# Patient Record
Sex: FEMALE | Race: WHITE | Employment: FULL TIME | URBAN - METROPOLITAN AREA
[De-identification: names, ages, dates, MRNs, and addresses within clinical notes are randomized per-mention and may not be internally consistent; named-entity substitution may affect disease eponyms.]

---

## 2024-05-29 ENCOUNTER — HOSPITAL ENCOUNTER (INPATIENT)
Age: 29
LOS: 1 days | Discharge: HOME | End: 2024-05-31
Attending: EMERGENCY MEDICINE | Admitting: THORACIC SURGERY (CARDIOTHORACIC VASCULAR SURGERY)
Payer: COMMERCIAL

## 2024-05-29 DIAGNOSIS — K31.89 GASTRIC DISTENTION: Primary | ICD-10-CM

## 2024-05-29 DIAGNOSIS — K91.89 OTHER POSTOPERATIVE COMPLICATION INVOLVING DIGESTIVE SYSTEM: ICD-10-CM

## 2024-05-29 DIAGNOSIS — R11.2 NAUSEA AND VOMITING, UNSPECIFIED VOMITING TYPE: ICD-10-CM

## 2024-05-29 PROCEDURE — 99285 EMERGENCY DEPT VISIT HI MDM: CPT

## 2024-05-29 PROCEDURE — 96374 THER/PROPH/DIAG INJ IV PUSH: CPT

## 2024-05-29 PROCEDURE — 6360000002 HC RX W HCPCS: Performed by: EMERGENCY MEDICINE

## 2024-05-29 PROCEDURE — 2580000003 HC RX 258: Performed by: EMERGENCY MEDICINE

## 2024-05-29 RX ORDER — PROMETHAZINE HYDROCHLORIDE 25 MG/ML
25 INJECTION, SOLUTION INTRAMUSCULAR; INTRAVENOUS ONCE
Status: COMPLETED | OUTPATIENT
Start: 2024-05-29 | End: 2024-05-30

## 2024-05-29 RX ORDER — ONDANSETRON 2 MG/ML
4 INJECTION INTRAMUSCULAR; INTRAVENOUS ONCE
Status: COMPLETED | OUTPATIENT
Start: 2024-05-29 | End: 2024-05-29

## 2024-05-29 RX ORDER — 0.9 % SODIUM CHLORIDE 0.9 %
1000 INTRAVENOUS SOLUTION INTRAVENOUS ONCE
Status: COMPLETED | OUTPATIENT
Start: 2024-05-29 | End: 2024-05-30

## 2024-05-29 RX ADMIN — ONDANSETRON 4 MG: 2 INJECTION INTRAMUSCULAR; INTRAVENOUS at 23:37

## 2024-05-29 RX ADMIN — SODIUM CHLORIDE 1000 ML: 9 INJECTION, SOLUTION INTRAVENOUS at 23:37

## 2024-05-30 ENCOUNTER — PREP FOR PROCEDURE (OUTPATIENT)
Dept: GASTROENTEROLOGY | Age: 29
End: 2024-05-30

## 2024-05-30 ENCOUNTER — APPOINTMENT (OUTPATIENT)
Dept: CT IMAGING | Age: 29
End: 2024-05-30
Payer: COMMERCIAL

## 2024-05-30 ENCOUNTER — APPOINTMENT (OUTPATIENT)
Dept: GENERAL RADIOLOGY | Age: 29
End: 2024-05-30
Payer: COMMERCIAL

## 2024-05-30 ENCOUNTER — ANESTHESIA (OUTPATIENT)
Dept: ENDOSCOPY | Age: 29
End: 2024-05-30
Payer: COMMERCIAL

## 2024-05-30 ENCOUNTER — ANESTHESIA EVENT (OUTPATIENT)
Dept: ENDOSCOPY | Age: 29
End: 2024-05-30
Payer: COMMERCIAL

## 2024-05-30 PROBLEM — R11.2 NAUSEA AND VOMITING: Status: ACTIVE | Noted: 2024-05-30

## 2024-05-30 PROBLEM — K31.89 GASTRIC DISTENTION: Status: ACTIVE | Noted: 2024-05-30

## 2024-05-30 PROBLEM — K31.84 GASTROPARESIS: Status: ACTIVE | Noted: 2024-05-30

## 2024-05-30 LAB
ALBUMIN SERPL-MCNC: 4.7 G/DL (ref 3.5–4.6)
ALP SERPL-CCNC: 83 U/L (ref 40–130)
ALT SERPL-CCNC: 55 U/L (ref 0–33)
AMPHET UR QL SCN: ABNORMAL
ANION GAP SERPL CALCULATED.3IONS-SCNC: 19 MEQ/L (ref 9–15)
AST SERPL-CCNC: 29 U/L (ref 0–35)
BACTERIA URNS QL MICRO: ABNORMAL /HPF
BARBITURATES UR QL SCN: ABNORMAL
BASOPHILS # BLD: 0.1 K/UL (ref 0–0.2)
BASOPHILS NFR BLD: 0.5 %
BENZODIAZ UR QL SCN: ABNORMAL
BILIRUB SERPL-MCNC: 0.5 MG/DL (ref 0.2–0.7)
BILIRUB UR QL STRIP: NEGATIVE
BUN SERPL-MCNC: 14 MG/DL (ref 6–20)
CALCIUM SERPL-MCNC: 10.1 MG/DL (ref 8.5–9.9)
CANNABINOIDS UR QL SCN: POSITIVE
CHLORIDE SERPL-SCNC: 100 MEQ/L (ref 95–107)
CLARITY UR: CLEAR
CO2 SERPL-SCNC: 19 MEQ/L (ref 20–31)
COCAINE UR QL SCN: ABNORMAL
COLOR UR: YELLOW
CREAT SERPL-MCNC: 0.76 MG/DL (ref 0.5–0.9)
DRUG SCREEN COMMENT UR-IMP: ABNORMAL
EOSINOPHIL # BLD: 0.3 K/UL (ref 0–0.7)
EOSINOPHIL NFR BLD: 1.6 %
EPI CELLS #/AREA URNS AUTO: ABNORMAL /HPF (ref 0–5)
ERYTHROCYTE [DISTWIDTH] IN BLOOD BY AUTOMATED COUNT: 13.5 % (ref 11.5–14.5)
ERYTHROCYTE [DISTWIDTH] IN BLOOD BY AUTOMATED COUNT: 13.6 % (ref 11.5–14.5)
FENTANYL SCREEN, URINE: ABNORMAL
GLOBULIN SER CALC-MCNC: 3.1 G/DL (ref 2.3–3.5)
GLUCOSE SERPL-MCNC: 171 MG/DL (ref 70–99)
GLUCOSE UR STRIP-MCNC: NEGATIVE MG/DL
HCG, URINE, POC: NEGATIVE
HCT VFR BLD AUTO: 39.5 % (ref 37–47)
HCT VFR BLD AUTO: 43.8 % (ref 37–47)
HGB BLD-MCNC: 13.3 G/DL (ref 12–16)
HGB BLD-MCNC: 14.9 G/DL (ref 12–16)
HGB UR QL STRIP: NEGATIVE
HYALINE CASTS #/AREA URNS AUTO: ABNORMAL /HPF (ref 0–5)
KETONES UR STRIP-MCNC: >=80 MG/DL
LEUKOCYTE ESTERASE UR QL STRIP: ABNORMAL
LYMPHOCYTES # BLD: 1.3 K/UL (ref 1–4.8)
LYMPHOCYTES NFR BLD: 7.7 %
Lab: NORMAL
MCH RBC QN AUTO: 28.8 PG (ref 27–31.3)
MCH RBC QN AUTO: 28.8 PG (ref 27–31.3)
MCHC RBC AUTO-ENTMCNC: 33.7 % (ref 33–37)
MCHC RBC AUTO-ENTMCNC: 34 % (ref 33–37)
MCV RBC AUTO: 84.7 FL (ref 79.4–94.8)
MCV RBC AUTO: 85.5 FL (ref 79.4–94.8)
METHADONE UR QL SCN: ABNORMAL
MONOCYTES # BLD: 0.9 K/UL (ref 0.2–0.8)
MONOCYTES NFR BLD: 5 %
NEGATIVE QC PASS/FAIL: NORMAL
NEUTROPHILS # BLD: 14.4 K/UL (ref 1.4–6.5)
NEUTS SEG NFR BLD: 84.6 %
NITRITE UR QL STRIP: NEGATIVE
OPIATES UR QL SCN: ABNORMAL
OXYCODONE UR QL SCN: POSITIVE
PCP UR QL SCN: ABNORMAL
PH UR STRIP: 6 [PH] (ref 5–9)
PLATELET # BLD AUTO: 456 K/UL (ref 130–400)
PLATELET # BLD AUTO: 555 K/UL (ref 130–400)
POSITIVE QC PASS/FAIL: NORMAL
POTASSIUM SERPL-SCNC: 3.6 MEQ/L (ref 3.4–4.9)
PROPOXYPH UR QL SCN: ABNORMAL
PROT SERPL-MCNC: 7.8 G/DL (ref 6.3–8)
PROT UR STRIP-MCNC: ABNORMAL MG/DL
RBC # BLD AUTO: 4.62 M/UL (ref 4.2–5.4)
RBC # BLD AUTO: 5.17 M/UL (ref 4.2–5.4)
RBC #/AREA URNS AUTO: ABNORMAL /HPF (ref 0–5)
SODIUM SERPL-SCNC: 138 MEQ/L (ref 135–144)
SP GR UR STRIP: 1.03 (ref 1–1.03)
UROBILINOGEN UR STRIP-ACNC: 0.2 E.U./DL
WBC # BLD AUTO: 11.8 K/UL (ref 4.8–10.8)
WBC # BLD AUTO: 17 K/UL (ref 4.8–10.8)
WBC #/AREA URNS AUTO: ABNORMAL /HPF (ref 0–5)

## 2024-05-30 PROCEDURE — 80053 COMPREHEN METABOLIC PANEL: CPT

## 2024-05-30 PROCEDURE — 0D9670Z DRAINAGE OF STOMACH WITH DRAINAGE DEVICE, VIA NATURAL OR ARTIFICIAL OPENING: ICD-10-PCS | Performed by: PEDIATRICS

## 2024-05-30 PROCEDURE — A4216 STERILE WATER/SALINE, 10 ML: HCPCS | Performed by: INTERNAL MEDICINE

## 2024-05-30 PROCEDURE — 7100000010 HC PHASE II RECOVERY - FIRST 15 MIN: Performed by: INTERNAL MEDICINE

## 2024-05-30 PROCEDURE — 96375 TX/PRO/DX INJ NEW DRUG ADDON: CPT

## 2024-05-30 PROCEDURE — 2580000003 HC RX 258: Performed by: INTERNAL MEDICINE

## 2024-05-30 PROCEDURE — 7100000011 HC PHASE II RECOVERY - ADDTL 15 MIN: Performed by: INTERNAL MEDICINE

## 2024-05-30 PROCEDURE — 85027 COMPLETE CBC AUTOMATED: CPT

## 2024-05-30 PROCEDURE — 6360000002 HC RX W HCPCS: Performed by: THORACIC SURGERY (CARDIOTHORACIC VASCULAR SURGERY)

## 2024-05-30 PROCEDURE — 1210000000 HC MED SURG R&B

## 2024-05-30 PROCEDURE — 96372 THER/PROPH/DIAG INJ SC/IM: CPT

## 2024-05-30 PROCEDURE — 3609017100 HC EGD: Performed by: INTERNAL MEDICINE

## 2024-05-30 PROCEDURE — 6370000000 HC RX 637 (ALT 250 FOR IP): Performed by: THORACIC SURGERY (CARDIOTHORACIC VASCULAR SURGERY)

## 2024-05-30 PROCEDURE — 99254 IP/OBS CNSLTJ NEW/EST MOD 60: CPT | Performed by: INTERNAL MEDICINE

## 2024-05-30 PROCEDURE — 6360000004 HC RX CONTRAST MEDICATION: Performed by: EMERGENCY MEDICINE

## 2024-05-30 PROCEDURE — 2709999900 HC NON-CHARGEABLE SUPPLY: Performed by: INTERNAL MEDICINE

## 2024-05-30 PROCEDURE — 80307 DRUG TEST PRSMV CHEM ANLYZR: CPT

## 2024-05-30 PROCEDURE — A4216 STERILE WATER/SALINE, 10 ML: HCPCS | Performed by: THORACIC SURGERY (CARDIOTHORACIC VASCULAR SURGERY)

## 2024-05-30 PROCEDURE — 6360000002 HC RX W HCPCS: Performed by: REGISTERED NURSE

## 2024-05-30 PROCEDURE — 71045 X-RAY EXAM CHEST 1 VIEW: CPT

## 2024-05-30 PROCEDURE — 3700000001 HC ADD 15 MINUTES (ANESTHESIA): Performed by: INTERNAL MEDICINE

## 2024-05-30 PROCEDURE — 0DJ08ZZ INSPECTION OF UPPER INTESTINAL TRACT, VIA NATURAL OR ARTIFICIAL OPENING ENDOSCOPIC: ICD-10-PCS | Performed by: INTERNAL MEDICINE

## 2024-05-30 PROCEDURE — 2500000003 HC RX 250 WO HCPCS: Performed by: THORACIC SURGERY (CARDIOTHORACIC VASCULAR SURGERY)

## 2024-05-30 PROCEDURE — 2500000003 HC RX 250 WO HCPCS: Performed by: REGISTERED NURSE

## 2024-05-30 PROCEDURE — 3700000000 HC ANESTHESIA ATTENDED CARE: Performed by: INTERNAL MEDICINE

## 2024-05-30 PROCEDURE — 74177 CT ABD & PELVIS W/CONTRAST: CPT

## 2024-05-30 PROCEDURE — 74022 RADEX COMPL AQT ABD SERIES: CPT

## 2024-05-30 PROCEDURE — 85025 COMPLETE CBC W/AUTO DIFF WBC: CPT

## 2024-05-30 PROCEDURE — 81001 URINALYSIS AUTO W/SCOPE: CPT

## 2024-05-30 PROCEDURE — 99222 1ST HOSP IP/OBS MODERATE 55: CPT | Performed by: THORACIC SURGERY (CARDIOTHORACIC VASCULAR SURGERY)

## 2024-05-30 PROCEDURE — 36415 COLL VENOUS BLD VENIPUNCTURE: CPT

## 2024-05-30 PROCEDURE — C9113 INJ PANTOPRAZOLE SODIUM, VIA: HCPCS | Performed by: INTERNAL MEDICINE

## 2024-05-30 PROCEDURE — 2580000003 HC RX 258: Performed by: THORACIC SURGERY (CARDIOTHORACIC VASCULAR SURGERY)

## 2024-05-30 PROCEDURE — 6360000002 HC RX W HCPCS: Performed by: INTERNAL MEDICINE

## 2024-05-30 PROCEDURE — 6370000000 HC RX 637 (ALT 250 FOR IP): Performed by: INTERNAL MEDICINE

## 2024-05-30 PROCEDURE — 6360000002 HC RX W HCPCS: Performed by: EMERGENCY MEDICINE

## 2024-05-30 RX ORDER — SODIUM CHLORIDE 0.9 % (FLUSH) 0.9 %
5-40 SYRINGE (ML) INJECTION PRN
Status: CANCELLED | OUTPATIENT
Start: 2024-05-30

## 2024-05-30 RX ORDER — ENOXAPARIN SODIUM 100 MG/ML
40 INJECTION SUBCUTANEOUS DAILY
Status: DISCONTINUED | OUTPATIENT
Start: 2024-05-30 | End: 2024-05-30 | Stop reason: SDUPTHER

## 2024-05-30 RX ORDER — ONDANSETRON 2 MG/ML
4 INJECTION INTRAMUSCULAR; INTRAVENOUS EVERY 6 HOURS PRN
Status: DISCONTINUED | OUTPATIENT
Start: 2024-05-30 | End: 2024-05-30 | Stop reason: SDUPTHER

## 2024-05-30 RX ORDER — ACETAMINOPHEN 650 MG/1
650 SUPPOSITORY RECTAL EVERY 6 HOURS PRN
Status: DISCONTINUED | OUTPATIENT
Start: 2024-05-30 | End: 2024-05-31 | Stop reason: HOSPADM

## 2024-05-30 RX ORDER — SUCRALFATE 1 G/1
1 TABLET ORAL
Status: DISCONTINUED | OUTPATIENT
Start: 2024-05-30 | End: 2024-05-31 | Stop reason: HOSPADM

## 2024-05-30 RX ORDER — CETIRIZINE HYDROCHLORIDE 10 MG/1
10 TABLET ORAL NIGHTLY
COMMUNITY
Start: 2023-06-30

## 2024-05-30 RX ORDER — LIDOCAINE HYDROCHLORIDE 20 MG/ML
INJECTION, SOLUTION INFILTRATION; PERINEURAL PRN
Status: DISCONTINUED | OUTPATIENT
Start: 2024-05-30 | End: 2024-05-30 | Stop reason: SDUPTHER

## 2024-05-30 RX ORDER — SODIUM CHLORIDE 0.9 % (FLUSH) 0.9 %
5-40 SYRINGE (ML) INJECTION PRN
Status: DISCONTINUED | OUTPATIENT
Start: 2024-05-30 | End: 2024-05-31 | Stop reason: HOSPADM

## 2024-05-30 RX ORDER — SODIUM CHLORIDE 0.9 % (FLUSH) 0.9 %
5-40 SYRINGE (ML) INJECTION EVERY 12 HOURS SCHEDULED
Status: DISCONTINUED | OUTPATIENT
Start: 2024-05-30 | End: 2024-05-31 | Stop reason: HOSPADM

## 2024-05-30 RX ORDER — BUPROPION HYDROCHLORIDE 150 MG/1
150 TABLET, EXTENDED RELEASE ORAL 2 TIMES DAILY
COMMUNITY
Start: 2024-05-01 | End: 2024-06-30

## 2024-05-30 RX ORDER — MORPHINE SULFATE 4 MG/ML
4 INJECTION, SOLUTION INTRAMUSCULAR; INTRAVENOUS
Status: DISCONTINUED | OUTPATIENT
Start: 2024-05-30 | End: 2024-05-31 | Stop reason: HOSPADM

## 2024-05-30 RX ORDER — SODIUM CHLORIDE 9 MG/ML
INJECTION, SOLUTION INTRAVENOUS CONTINUOUS
Status: DISCONTINUED | OUTPATIENT
Start: 2024-05-30 | End: 2024-05-30

## 2024-05-30 RX ORDER — SIMETHICONE 80 MG
80 TABLET,CHEWABLE ORAL 4 TIMES DAILY
COMMUNITY
Start: 2024-05-25 | End: 2024-06-01

## 2024-05-30 RX ORDER — SULFAMETHOXAZOLE AND TRIMETHOPRIM 800; 160 MG/1; MG/1
1 TABLET ORAL EVERY 12 HOURS SCHEDULED
Status: DISCONTINUED | OUTPATIENT
Start: 2024-05-30 | End: 2024-05-31 | Stop reason: HOSPADM

## 2024-05-30 RX ORDER — ENOXAPARIN SODIUM 100 MG/ML
40 INJECTION SUBCUTANEOUS DAILY
Status: DISCONTINUED | OUTPATIENT
Start: 2024-05-30 | End: 2024-05-31 | Stop reason: HOSPADM

## 2024-05-30 RX ORDER — MAGNESIUM SULFATE IN WATER 40 MG/ML
2000 INJECTION, SOLUTION INTRAVENOUS PRN
Status: DISCONTINUED | OUTPATIENT
Start: 2024-05-30 | End: 2024-05-31 | Stop reason: HOSPADM

## 2024-05-30 RX ORDER — GLYCOPYRROLATE 1 MG/5 ML
SYRINGE (ML) INTRAVENOUS PRN
Status: DISCONTINUED | OUTPATIENT
Start: 2024-05-30 | End: 2024-05-30 | Stop reason: SDUPTHER

## 2024-05-30 RX ORDER — ESCITALOPRAM OXALATE 10 MG/1
10 TABLET ORAL DAILY
COMMUNITY
Start: 2024-02-13

## 2024-05-30 RX ORDER — SODIUM CHLORIDE 9 MG/ML
INJECTION, SOLUTION INTRAVENOUS PRN
Status: CANCELLED | OUTPATIENT
Start: 2024-05-30

## 2024-05-30 RX ORDER — METOCLOPRAMIDE HYDROCHLORIDE 5 MG/ML
10 INJECTION INTRAMUSCULAR; INTRAVENOUS ONCE
Status: COMPLETED | OUTPATIENT
Start: 2024-05-30 | End: 2024-05-30

## 2024-05-30 RX ORDER — SODIUM CHLORIDE 9 MG/ML
INJECTION, SOLUTION INTRAVENOUS PRN
Status: DISCONTINUED | OUTPATIENT
Start: 2024-05-30 | End: 2024-05-31 | Stop reason: HOSPADM

## 2024-05-30 RX ORDER — PANTOPRAZOLE SODIUM 40 MG/1
40 TABLET, DELAYED RELEASE ORAL
COMMUNITY
Start: 2024-05-09 | End: 2025-05-09

## 2024-05-30 RX ORDER — ACETAMINOPHEN 325 MG/1
650 TABLET ORAL EVERY 6 HOURS PRN
Status: DISCONTINUED | OUTPATIENT
Start: 2024-05-30 | End: 2024-05-31 | Stop reason: HOSPADM

## 2024-05-30 RX ORDER — ONDANSETRON 4 MG/1
4 TABLET, ORALLY DISINTEGRATING ORAL EVERY 8 HOURS PRN
Status: DISCONTINUED | OUTPATIENT
Start: 2024-05-30 | End: 2024-05-31 | Stop reason: HOSPADM

## 2024-05-30 RX ORDER — SODIUM CHLORIDE 0.9 % (FLUSH) 0.9 %
5-40 SYRINGE (ML) INJECTION EVERY 12 HOURS SCHEDULED
Status: CANCELLED | OUTPATIENT
Start: 2024-05-30

## 2024-05-30 RX ORDER — DEXTROSE MONOHYDRATE AND SODIUM CHLORIDE 5; .45 G/100ML; G/100ML
INJECTION, SOLUTION INTRAVENOUS CONTINUOUS
Status: DISCONTINUED | OUTPATIENT
Start: 2024-05-30 | End: 2024-05-31

## 2024-05-30 RX ORDER — ONDANSETRON 2 MG/ML
4 INJECTION INTRAMUSCULAR; INTRAVENOUS EVERY 6 HOURS PRN
Status: DISCONTINUED | OUTPATIENT
Start: 2024-05-30 | End: 2024-05-31 | Stop reason: HOSPADM

## 2024-05-30 RX ORDER — PROMETHAZINE HYDROCHLORIDE 12.5 MG/1
12.5 TABLET ORAL EVERY 6 HOURS PRN
Status: DISCONTINUED | OUTPATIENT
Start: 2024-05-30 | End: 2024-05-31 | Stop reason: HOSPADM

## 2024-05-30 RX ORDER — ONDANSETRON 4 MG/1
4 TABLET, ORALLY DISINTEGRATING ORAL EVERY 8 HOURS PRN
COMMUNITY
Start: 2024-03-07

## 2024-05-30 RX ORDER — MORPHINE SULFATE 2 MG/ML
2 INJECTION, SOLUTION INTRAMUSCULAR; INTRAVENOUS
Status: DISCONTINUED | OUTPATIENT
Start: 2024-05-30 | End: 2024-05-30 | Stop reason: SDUPTHER

## 2024-05-30 RX ORDER — METOCLOPRAMIDE HYDROCHLORIDE 5 MG/ML
10 INJECTION INTRAMUSCULAR; INTRAVENOUS EVERY 6 HOURS
Status: DISCONTINUED | OUTPATIENT
Start: 2024-05-30 | End: 2024-05-31 | Stop reason: HOSPADM

## 2024-05-30 RX ORDER — POTASSIUM CHLORIDE 7.45 MG/ML
10 INJECTION INTRAVENOUS PRN
Status: DISCONTINUED | OUTPATIENT
Start: 2024-05-30 | End: 2024-05-31 | Stop reason: HOSPADM

## 2024-05-30 RX ORDER — PROPOFOL 10 MG/ML
INJECTION, EMULSION INTRAVENOUS PRN
Status: DISCONTINUED | OUTPATIENT
Start: 2024-05-30 | End: 2024-05-30 | Stop reason: SDUPTHER

## 2024-05-30 RX ORDER — POTASSIUM CHLORIDE 20 MEQ/1
40 TABLET, EXTENDED RELEASE ORAL PRN
Status: DISCONTINUED | OUTPATIENT
Start: 2024-05-30 | End: 2024-05-31 | Stop reason: HOSPADM

## 2024-05-30 RX ORDER — OXYCODONE HYDROCHLORIDE 5 MG/1
5 TABLET ORAL EVERY 6 HOURS PRN
COMMUNITY
Start: 2024-05-25

## 2024-05-30 RX ORDER — MORPHINE SULFATE 4 MG/ML
4 INJECTION, SOLUTION INTRAMUSCULAR; INTRAVENOUS
Status: COMPLETED | OUTPATIENT
Start: 2024-05-30 | End: 2024-05-30

## 2024-05-30 RX ORDER — ACETAMINOPHEN 325 MG/1
650 TABLET ORAL EVERY 4 HOURS PRN
Status: DISCONTINUED | OUTPATIENT
Start: 2024-05-30 | End: 2024-05-30 | Stop reason: SDUPTHER

## 2024-05-30 RX ORDER — SODIUM CHLORIDE 9 MG/ML
INJECTION, SOLUTION INTRAVENOUS CONTINUOUS
Status: CANCELLED | OUTPATIENT
Start: 2024-05-30

## 2024-05-30 RX ORDER — LORAZEPAM 2 MG/ML
0.5 INJECTION INTRAMUSCULAR EVERY 6 HOURS PRN
Status: DISCONTINUED | OUTPATIENT
Start: 2024-05-30 | End: 2024-05-31 | Stop reason: HOSPADM

## 2024-05-30 RX ORDER — BISACODYL 10 MG
10 SUPPOSITORY, RECTAL RECTAL DAILY PRN
Status: DISCONTINUED | OUTPATIENT
Start: 2024-05-30 | End: 2024-05-31 | Stop reason: HOSPADM

## 2024-05-30 RX ADMIN — SULFAMETHOXAZOLE AND TRIMETHOPRIM 1 TABLET: 800; 160 TABLET ORAL at 17:22

## 2024-05-30 RX ADMIN — IOPAMIDOL 75 ML: 755 INJECTION, SOLUTION INTRAVENOUS at 02:21

## 2024-05-30 RX ADMIN — DEXTROSE AND SODIUM CHLORIDE: 5; 450 INJECTION, SOLUTION INTRAVENOUS at 07:57

## 2024-05-30 RX ADMIN — Medication 0.2 MG: at 15:58

## 2024-05-30 RX ADMIN — MORPHINE SULFATE 4 MG: 4 INJECTION, SOLUTION INTRAMUSCULAR; INTRAVENOUS at 21:09

## 2024-05-30 RX ADMIN — LIDOCAINE HYDROCHLORIDE 60 MG: 20 INJECTION, SOLUTION INFILTRATION; PERINEURAL at 15:58

## 2024-05-30 RX ADMIN — Medication 0.5 MG: at 14:17

## 2024-05-30 RX ADMIN — Medication 0.5 MG: at 21:06

## 2024-05-30 RX ADMIN — SUCRALFATE 1 G: 1 TABLET ORAL at 17:23

## 2024-05-30 RX ADMIN — METOCLOPRAMIDE HYDROCHLORIDE 10 MG: 5 INJECTION INTRAMUSCULAR; INTRAVENOUS at 03:26

## 2024-05-30 RX ADMIN — DEXTROSE AND SODIUM CHLORIDE: 5; 450 INJECTION, SOLUTION INTRAVENOUS at 21:28

## 2024-05-30 RX ADMIN — SULFAMETHOXAZOLE AND TRIMETHOPRIM 1 TABLET: 800; 160 TABLET ORAL at 21:15

## 2024-05-30 RX ADMIN — MORPHINE SULFATE 4 MG: 4 INJECTION, SOLUTION INTRAMUSCULAR; INTRAVENOUS at 09:48

## 2024-05-30 RX ADMIN — MORPHINE SULFATE 4 MG: 4 INJECTION, SOLUTION INTRAMUSCULAR; INTRAVENOUS at 03:38

## 2024-05-30 RX ADMIN — PROPOFOL 50 MG: 10 INJECTION, EMULSION INTRAVENOUS at 16:09

## 2024-05-30 RX ADMIN — PROMETHAZINE HYDROCHLORIDE 25 MG: 25 INJECTION INTRAMUSCULAR; INTRAVENOUS at 00:37

## 2024-05-30 RX ADMIN — Medication 10 ML: at 21:16

## 2024-05-30 RX ADMIN — ONDANSETRON 4 MG: 2 INJECTION INTRAMUSCULAR; INTRAVENOUS at 07:50

## 2024-05-30 RX ADMIN — SODIUM CHLORIDE, PRESERVATIVE FREE 10 ML: 5 INJECTION INTRAVENOUS at 21:15

## 2024-05-30 RX ADMIN — PROMETHAZINE HYDROCHLORIDE 12.5 MG: 12.5 TABLET ORAL at 18:42

## 2024-05-30 RX ADMIN — FAMOTIDINE 20 MG: 10 INJECTION, SOLUTION INTRAVENOUS at 07:53

## 2024-05-30 RX ADMIN — FAMOTIDINE 20 MG: 10 INJECTION, SOLUTION INTRAVENOUS at 21:11

## 2024-05-30 RX ADMIN — SODIUM CHLORIDE: 9 INJECTION, SOLUTION INTRAVENOUS at 06:16

## 2024-05-30 RX ADMIN — METOCLOPRAMIDE HYDROCHLORIDE 10 MG: 5 INJECTION INTRAMUSCULAR; INTRAVENOUS at 21:11

## 2024-05-30 RX ADMIN — PROPOFOL 150 MG: 10 INJECTION, EMULSION INTRAVENOUS at 15:58

## 2024-05-30 RX ADMIN — METOCLOPRAMIDE HYDROCHLORIDE 10 MG: 5 INJECTION INTRAMUSCULAR; INTRAVENOUS at 09:48

## 2024-05-30 RX ADMIN — PROPOFOL 50 MG: 10 INJECTION, EMULSION INTRAVENOUS at 16:02

## 2024-05-30 RX ADMIN — METOCLOPRAMIDE HYDROCHLORIDE 10 MG: 5 INJECTION INTRAMUSCULAR; INTRAVENOUS at 17:23

## 2024-05-30 RX ADMIN — PROPOFOL 100 MG: 10 INJECTION, EMULSION INTRAVENOUS at 16:06

## 2024-05-30 RX ADMIN — PANTOPRAZOLE SODIUM 40 MG: 40 INJECTION, POWDER, FOR SOLUTION INTRAVENOUS at 17:23

## 2024-05-30 RX ADMIN — SUCRALFATE 1 G: 1 TABLET ORAL at 21:15

## 2024-05-30 ASSESSMENT — PAIN SCALES - GENERAL
PAINLEVEL_OUTOF10: 7
PAINLEVEL_OUTOF10: 3
PAINLEVEL_OUTOF10: 4
PAINLEVEL_OUTOF10: 6
PAINLEVEL_OUTOF10: 4
PAINLEVEL_OUTOF10: 8

## 2024-05-30 ASSESSMENT — LIFESTYLE VARIABLES
HOW OFTEN DO YOU HAVE A DRINK CONTAINING ALCOHOL: NEVER
HOW MANY STANDARD DRINKS CONTAINING ALCOHOL DO YOU HAVE ON A TYPICAL DAY: PATIENT DOES NOT DRINK

## 2024-05-30 ASSESSMENT — PAIN DESCRIPTION - ORIENTATION
ORIENTATION: MID
ORIENTATION: MID;LEFT

## 2024-05-30 ASSESSMENT — ENCOUNTER SYMPTOMS
VOMITING: 0
STRIDOR: 0
CHOKING: 0
ABDOMINAL PAIN: 1
DIARRHEA: 0
SORE THROAT: 0
WHEEZING: 0
SHORTNESS OF BREATH: 0
COUGH: 0
VOMITING: 1
ABDOMINAL DISTENTION: 0
TROUBLE SWALLOWING: 0
ABDOMINAL PAIN: 0
CHEST TIGHTNESS: 0
DIARRHEA: 1
VOICE CHANGE: 0
NAUSEA: 1

## 2024-05-30 ASSESSMENT — PAIN DESCRIPTION - DESCRIPTORS
DESCRIPTORS: ACHING
DESCRIPTORS: DISCOMFORT;SHARP;ACHING
DESCRIPTORS: ACHING;DISCOMFORT;CRAMPING

## 2024-05-30 ASSESSMENT — PAIN DESCRIPTION - LOCATION
LOCATION: ABDOMEN

## 2024-05-30 ASSESSMENT — PAIN - FUNCTIONAL ASSESSMENT
PAIN_FUNCTIONAL_ASSESSMENT: 0-10
PAIN_FUNCTIONAL_ASSESSMENT: 0-10

## 2024-05-30 NOTE — CARE COORDINATION
Case Management Assessment  Initial Evaluation    Date/Time of Evaluation: 5/30/2024 10:52 AM  Assessment Completed by: Marycarmen Van    If patient is discharged prior to next notation, then this note serves as note for discharge by case management.    Patient Name: Alison Villegas                   YOB: 1995  Diagnosis: Gastric distention [K31.89]  Other postoperative complication involving digestive system [K91.89]  Nausea and vomiting, unspecified vomiting type [R11.2]  Gastroparesis [K31.84]                   Date / Time: 5/29/2024 11:19 PM    Patient Admission Status: Inpatient   Readmission Risk (Low < 19, Mod (19-27), High > 27): Readmission Risk Score: 5.2    Current PCP: Kevin Blum MD  PCP verified by CM? Yes    Chart Reviewed: Yes      History Provided by: Patient  Patient Orientation: Alert and Oriented, Person, Place, Situation, Self    Patient Cognition: Alert    Hospitalization in the last 30 days (Readmission):  No    If yes, Readmission Assessment in CM Navigator will be completed.    Advance Directives:      Code Status: Full Code   Patient's Primary Decision Maker is: Named in Scanned ACP Document      Discharge Planning:    Patient lives with: Children Type of Home: Apartment  Primary Care Giver: Self  Patient Support Systems include: Parent, Children   Current Financial resources:    Current community resources:    Current services prior to admission: None            Current DME:              Type of Home Care services:  None    ADLS  Prior functional level: Independent in ADLs/IADLs  Current functional level: Independent in ADLs/IADLs    PT AM-PAC:   /24  OT AM-PAC:   /24    Family can provide assistance at DC: Yes  Would you like Case Management to discuss the discharge plan with any other family members/significant others, and if so, who? Yes  Plans to Return to Present Housing: Yes  Other Identified Issues/Barriers to RETURNING to current housing: NO  Potential Assistance

## 2024-05-30 NOTE — PROGRESS NOTES
Shift assessment complete. VSS A&Ox4. Patient is anxious, cooperative. Denies feeling short of breath. Complaining of nausea and abdominal pain this morning. Medicated with reglan, zofran, and PRN morphine. Pain reassessed 2/10. No emesis present this morning. NGT in place, to LIWS. (NGT removed by Dr Smith earlier this morning.)  Benitez output present. Patient NPO. Has had one bowel movement today. Passing gas. Incisions to Abdomen ORTIZ, closed with surgical glue. Up in room independently. IN bed with call light in reach. No needs at this time. Care ongoing    PLAN: EGD with Dr Finnegan this afternoon    Electronically signed by Segun Doll RN on 5/30/2024 at 1:34 PM     1417 - PRN ativan ordered and given for anxiety     1730 - Patient back to 4W from EGD. Per Attending, okay for FLD. VSS

## 2024-05-30 NOTE — PLAN OF CARE
Problem: Discharge Planning  Goal: Discharge to home or other facility with appropriate resources  5/30/2024 1336 by Segun Doll RN  Outcome: Progressing  5/30/2024 1335 by Segun Doll RN  Outcome: Progressing  5/30/2024 1051 by Segun Doll RN  Outcome: Progressing  Flowsheets (Taken 5/30/2024 0750)  Discharge to home or other facility with appropriate resources: Identify barriers to discharge with patient and caregiver  5/30/2024 0650 by Martha Oconnor RN  Outcome: Progressing     Problem: Pain  Goal: Verbalizes/displays adequate comfort level or baseline comfort level  5/30/2024 1336 by Segun Doll RN  Outcome: Progressing  5/30/2024 1335 by Segun Doll RN  Outcome: Progressing  5/30/2024 1051 by Segun Doll RN  Outcome: Progressing  5/30/2024 0650 by Martha Oconnor RN  Outcome: Progressing     Problem: Safety - Adult  Goal: Free from fall injury  Outcome: Progressing

## 2024-05-30 NOTE — H&P
History and Physical  Patient: Alison Villegas  Unit/Bed:W471/W471-01  YOB: 1995  MRN: [id number]  Acct: [id number]   Admitting Diagnosis: Gastric distention [K31.89]  Other postoperative complication involving digestive system [K91.89]  Nausea and vomiting, unspecified vomiting type [R11.2]  Gastroparesis [K31.84]  Admit Date:  5/29/2024  Hospital Day: 0      Chief Complaint:   Vomiting    History of Present Illness:  Patient had a laparoscopic repair of a hiatal hernia with fundoplication for severe reflux approximately 6 days ago at another institution.  She was discharged postoperative day 1.  She says she was doing fine for about 3 to 4 days but then she suddenly started having intractable nausea and vomiting.  She came to our emergency department where a CAT scan showed gastric and duodenal distention.  She was still vomiting so a nasogastric tube was passed and patient was admitted for further evaluation.    No Known Allergies    Current Facility-Administered Medications   Medication Dose Route Frequency Provider Last Rate Last Admin    sodium chloride flush 0.9 % injection 5-40 mL  5-40 mL IntraVENous 2 times per day Jayjay Smith MD        sodium chloride flush 0.9 % injection 5-40 mL  5-40 mL IntraVENous PRN Jayjay Smith MD        0.9 % sodium chloride infusion   IntraVENous PRN Jayjay Smith MD        0.9 % sodium chloride infusion   IntraVENous Continuous Jayjay Smith MD 75 mL/hr at 05/30/24 0616 New Bag at 05/30/24 0616    promethazine (PHENERGAN) tablet 12.5 mg  12.5 mg Oral Q6H PRN Jayjay Smith MD        morphine sulfate (PF) injection 4 mg  4 mg IntraVENous Q2H PRN Jayjay Smith MD        sodium chloride flush 0.9 % injection 5-40 mL  5-40 mL IntraVENous 2 times per day Jayjay Smith MD        sodium chloride flush 0.9 % injection 5-40 mL  5-40 mL IntraVENous PRN Jayjay Smith MD        0.9 % sodium chloride infusion   IntraVENous PRN Jayjay Smith MD         evidence for obstructing gastric outlet process or mass. Underlying gastroparesis is possible. 2. Small, fatty umbilical hernia.     XR ACUTE ABD SERIES CHEST 1 VW    Result Date: 5/30/2024  EXAMINATION: TWO XRAY VIEWS OF THE ABDOMEN AND SINGLE  XRAY VIEW OF THE CHEST 5/30/2024 12:36 am COMPARISON: None. HISTORY: ORDERING SYSTEM PROVIDED HISTORY: scromiting TECHNOLOGIST PROVIDED HISTORY: Reason for exam:->scromiting What reading provider will be dictating this exam?->CRC FINDINGS: Normal cardiomediastinal silhouette.  Lungs clear.  No pneumothorax or effusion. Body wall soft tissues unremarkable. Osseous thorax intact.  No intraperitoneal free air. Moderate gastric air distension.  Question possible gastric outlet obstruction.  Relative paucity of bowel gas otherwise.  No ileus or obstruction.  No appendicoliths.  No organomegaly.  Osseous and body wall soft tissues unremarkable.     1. No acute cardiopulmonary process. 2. Moderate gastric air distension. Question possible gastric outlet obstruction. No ileus or obstruction.          EKG:       Assessment:    Active Hospital Problems    Diagnosis Date Noted    Gastric distention [K31.89] 05/30/2024    Gastroparesis [K31.84] 05/30/2024     Intractable nausea and vomiting several days out from repair of a paraesophageal hernia with evidence of gastric distention on CAT scan.  Concern for duodenal obstruction or gastroparesis.    Plan:  I explained the situation of the patient.  Will consult GI medicine for an extensive EGD with hopeful evaluation of the distal duodenum.  Will start the patient on prokinetics.        Electronically signed by TIKA KERN MD on 5/30/2024 at 9:26 AM

## 2024-05-30 NOTE — CONSULTS
Consult to Gastroenterology  Consult performed by: Jose Ramon Finnegan MD  Consult ordered by: Jayjay Smith MD          Patient Name: Alison Villegas  Admit Date: 2024 11:19 PM  MR #: [id number]  : 1995    Attending Physician: Jayjay Smith MD    History of Presenting Illness:      Alison Villegas is a 29 y.o. female on hospital day 0, admitted with Gastric distention, nausea, vomiting and abdominal pain. Patient with  has a past medical history of Asthma, Kidney stone, and Scoliosis.    Reason for GI consult: Gastric Distension and emesis after hiatal hernia repair at , possible pyloric spasm or duodenal partial obstruction    History Obtained From:  patient, electronic medical record, staff nurse    Patient is s/p laparoscopic repair of hiatal hernia with fundoplication at [facility] per Dr. Saldivar on 2024.  Patient presented to emergency department with abdominal pain, nausea vomiting and diarrhea.  Patient had nasogastric tube placed in ER for intractable vomiting despite antiemetics and it was removed this a.m.  Patient reports she has been taking Zofran and Phenergan for nausea since the procedure.    Previous endoscopic evaluation:  EGD and Colonoscopy 2024 Dr. Jose Ramon Aponte, [facility]  The esophagus appeared normal. Performed random biopsy using biopsy   forceps to rule out eosinophilic esophagitis.   4 cm hiatal hernia. Hill grade IV hiatal hernia   Erythematous mucosa in the stomach;   Performed forceps biopsies to rule out H. pylori   The duodenum appeared normal. Performed random biopsy using biopsy forceps to rule out celiac disease.     The terminal ileum appeared normal.;   One 5 mm sessile polyp in the ascending colon; performed cold snare with   complete en bloc removal and retrieved specimen   Internal hemorrhoids observed during retroflexion   Performed pancolonic forceps biopsies to rule out colitis     EGD 8/15/2022 Dr. Sigifredo Mota, [place]

## 2024-05-30 NOTE — ANESTHESIA POSTPROCEDURE EVALUATION
Department of Anesthesiology  Postprocedure Note    Patient: Alison Villegas  MRN: [id number]  YOB: 1995  Date of evaluation: 5/30/2024    Procedure Summary       Date: 05/30/24 Room / Location: [facility] OR 02 / [facility]    Anesthesia Start: 1555 Anesthesia Stop: 1610    Procedure: ESOPHAGOGASTRODUODENOSCOPY DIAGNOSTIC ONLY Diagnosis: Gastric distention    Surgeons: Jose Ramon Finnegan MD Responsible Provider: Merlin Koehler APRN - CRNA    Anesthesia Type: MAC ASA Status: 2            Anesthesia Type: No value filed.    Jaswinder Phase I: Jaswinder Score: 10    Jaswinder Phase II:      Anesthesia Post Evaluation    Patient location during evaluation: bedside  Patient participation: complete - patient participated  Level of consciousness: awake and awake and alert  Airway patency: patent  Nausea & Vomiting: no nausea and no vomiting  Cardiovascular status: blood pressure returned to baseline and hemodynamically stable  Respiratory status: acceptable  Hydration status: euvolemic  Pain management: adequate        No notable events documented.

## 2024-05-30 NOTE — ED TRIAGE NOTES
PT ARRIVES VIA EMS FOR NVD THAT STARTED AT 5PM. PT DRY HEAVING UPON TRIAGE, BUT NOT BRINGING ANYTHING UP. PT REPORTS HIATAL HERNIA SURGERY LAST WEEK. PT A&OX4. EMS DENIES MEDICATING PT.

## 2024-05-30 NOTE — ANESTHESIA PRE PROCEDURE
Allergies    Problem List:    Patient Active Problem List   Diagnosis Code    Gastric distention K31.89    Gastroparesis K31.84       Past Medical History:        Diagnosis Date    Asthma     Kidney stone     Scoliosis        Past Surgical History:        Procedure Laterality Date    DENTAL SURGERY      HIATAL HERNIA REPAIR         Social History:    Social History     Tobacco Use    Smoking status: Every Day     Current packs/day: 0.25     Types: Cigarettes    Smokeless tobacco: Not on file   Substance Use Topics    Alcohol use: No                                Ready to quit: Not Answered  Counseling given: Not Answered      Vital Signs (Current):   Vitals:    05/30/24 0514 05/30/24 0549 05/30/24 0600 05/30/24 0720   BP: 137/84 124/82  114/81   Pulse: 67 56  66   Resp: 18 20  20   Temp:  36.7 °C (98.1 °F)  36.8 °C (98.2 °F)   TempSrc:  Oral  Axillary   SpO2: 100% 100%  100%   Weight:   82.6 kg (182 lb)    Height:   1.702 m (5' 7\")                                               BP Readings from Last 3 Encounters:   05/30/24 114/81   09/16/15 110/70       NPO Status:                                                                                 BMI:   Wt Readings from Last 3 Encounters:   05/30/24 82.6 kg (182 lb)   09/16/15 66.1 kg (145 lb 12.8 oz) (77 %, Z= 0.74)*     * Growth percentiles are based on CDC (Girls, 2-20 Years) data.     Body mass index is 28.51 kg/m².    CBC:   Lab Results   Component Value Date/Time    WBC 11.8 05/30/2024 10:11 AM    RBC 4.62 05/30/2024 10:11 AM    HGB 13.3 05/30/2024 10:11 AM    HCT 39.5 05/30/2024 10:11 AM    MCV 85.5 05/30/2024 10:11 AM    RDW 13.6 05/30/2024 10:11 AM     05/30/2024 10:11 AM       CMP:   Lab Results   Component Value Date/Time     05/30/2024 12:00 AM    K 3.6 05/30/2024 12:00 AM     05/30/2024 12:00 AM    CO2 19 05/30/2024 12:00 AM    BUN 14 05/30/2024 12:00 AM    CREATININE 0.76 05/30/2024 12:00 AM    GFRAA NOT REPORTED 11/20/2014 09:45 PM

## 2024-05-30 NOTE — ED NOTES
THIS RN IN ROOM TO ASK PT FOR UA. PT RESTING IN BED. WHEN DOOR OPENS AND RN IN ROOM, PT IMMEDIATELY STARTS DRY HEAVING AND ASKING FOR MEDS. THIS RN UPDATED PT THAT THE DR IS AWARE SHE IS REQUESTING MEDS, BUT THERE ARE NO ORDERS AT THIS TIME.     AS VERO WARD HAD ANSWERED CALL LIGHT A FEW MINUTES PRIOR, AND ASKED MD FOR NAUSEA MEDS FOR PT.     PT HAS RECEIVED IV ZOFRAN AND IM PHENERGAN PRIOR TO THESE ENCOUNTERS.

## 2024-05-30 NOTE — ED PROVIDER NOTES
Underlying gastroparesis is possible.   2. Small, fatty umbilical hernia.         XR ACUTE ABD SERIES CHEST 1 VW   Final Result   1. No acute cardiopulmonary process.   2. Moderate gastric air distension. Question possible gastric outlet   obstruction. No ileus or obstruction.         XR CHEST ABDOMEN NG PLACEMENT    (Results Pending)         ED BEDSIDE ULTRASOUND:   Performed by ED Physician - none    LABS:  Labs Reviewed   CBC WITH AUTO DIFFERENTIAL - Abnormal; Notable for the following components:       Result Value    WBC 17.0 (*)     Platelets 555 (*)     Neutrophils Absolute 14.4 (*)     Monocytes Absolute 0.9 (*)     All other components within normal limits   COMPREHENSIVE METABOLIC PANEL - Abnormal; Notable for the following components:    CO2 19 (*)     Anion Gap 19 (*)     Glucose 171 (*)     Calcium 10.1 (*)     Albumin 4.7 (*)     ALT 55 (*)     All other components within normal limits   URINALYSIS - Abnormal; Notable for the following components:    Ketones, Urine >=80 (*)     Protein, UA TRACE (*)     Leukocyte Esterase, Urine TRACE (*)     All other components within normal limits   URINE DRUG SCREEN - Abnormal; Notable for the following components:    Cannabinoid Scrn, Ur POSITIVE (*)     Oxycodone Urine POSITIVE (*)     All other components within normal limits   MICROSCOPIC URINALYSIS - Abnormal; Notable for the following components:    Bacteria, UA RARE (*)     WBC, UA 6-9 (*)     RBC, UA 3-5 (*)     All other components within normal limits   POC PREGNANCY UR-QUAL       All other labs were within normal range or not returned as of this dictation.    EMERGENCY DEPARTMENT COURSE and DIFFERENTIAL DIAGNOSIS/MDM:   Vitals:    Vitals:    05/30/24 0125 05/30/24 0203 05/30/24 0329 05/30/24 0331   BP:  114/80  (!) 145/89   Pulse:   64    Resp:   22    Temp:       TempSrc:       SpO2: 100%  96%            Medical Decision Making  Amount and/or Complexity of Data Reviewed  Labs: ordered.  Radiology:

## 2024-05-31 VITALS
HEIGHT: 67 IN | TEMPERATURE: 99.5 F | DIASTOLIC BLOOD PRESSURE: 69 MMHG | WEIGHT: 182 LBS | BODY MASS INDEX: 28.56 KG/M2 | SYSTOLIC BLOOD PRESSURE: 125 MMHG | OXYGEN SATURATION: 99 % | RESPIRATION RATE: 22 BRPM | HEART RATE: 67 BPM

## 2024-05-31 PROBLEM — F41.0 PANIC DISORDER: Status: ACTIVE | Noted: 2024-05-31

## 2024-05-31 PROCEDURE — 6370000000 HC RX 637 (ALT 250 FOR IP): Performed by: THORACIC SURGERY (CARDIOTHORACIC VASCULAR SURGERY)

## 2024-05-31 PROCEDURE — 2580000003 HC RX 258: Performed by: THORACIC SURGERY (CARDIOTHORACIC VASCULAR SURGERY)

## 2024-05-31 PROCEDURE — A4216 STERILE WATER/SALINE, 10 ML: HCPCS | Performed by: INTERNAL MEDICINE

## 2024-05-31 PROCEDURE — A4216 STERILE WATER/SALINE, 10 ML: HCPCS | Performed by: THORACIC SURGERY (CARDIOTHORACIC VASCULAR SURGERY)

## 2024-05-31 PROCEDURE — 6370000000 HC RX 637 (ALT 250 FOR IP): Performed by: INTERNAL MEDICINE

## 2024-05-31 PROCEDURE — 6360000002 HC RX W HCPCS: Performed by: THORACIC SURGERY (CARDIOTHORACIC VASCULAR SURGERY)

## 2024-05-31 PROCEDURE — 2580000003 HC RX 258: Performed by: INTERNAL MEDICINE

## 2024-05-31 PROCEDURE — 6360000002 HC RX W HCPCS: Performed by: INTERNAL MEDICINE

## 2024-05-31 PROCEDURE — 99231 SBSQ HOSP IP/OBS SF/LOW 25: CPT | Performed by: THORACIC SURGERY (CARDIOTHORACIC VASCULAR SURGERY)

## 2024-05-31 PROCEDURE — C9113 INJ PANTOPRAZOLE SODIUM, VIA: HCPCS | Performed by: INTERNAL MEDICINE

## 2024-05-31 PROCEDURE — 90792 PSYCH DIAG EVAL W/MED SRVCS: CPT | Performed by: PSYCHIATRY & NEUROLOGY

## 2024-05-31 PROCEDURE — 2500000003 HC RX 250 WO HCPCS: Performed by: THORACIC SURGERY (CARDIOTHORACIC VASCULAR SURGERY)

## 2024-05-31 RX ORDER — SIMETHICONE 80 MG
80 TABLET,CHEWABLE ORAL 4 TIMES DAILY
Status: DISCONTINUED | OUTPATIENT
Start: 2024-05-31 | End: 2024-05-31 | Stop reason: HOSPADM

## 2024-05-31 RX ORDER — LORAZEPAM 2 MG/ML
0.5 INJECTION INTRAMUSCULAR ONCE
Status: COMPLETED | OUTPATIENT
Start: 2024-05-31 | End: 2024-05-31

## 2024-05-31 RX ORDER — OXYCODONE HYDROCHLORIDE 5 MG/1
5 TABLET ORAL EVERY 6 HOURS PRN
Status: DISCONTINUED | OUTPATIENT
Start: 2024-05-31 | End: 2024-05-31 | Stop reason: HOSPADM

## 2024-05-31 RX ORDER — BUPROPION HYDROCHLORIDE 150 MG/1
150 TABLET, EXTENDED RELEASE ORAL 2 TIMES DAILY
Status: DISCONTINUED | OUTPATIENT
Start: 2024-05-31 | End: 2024-05-31 | Stop reason: HOSPADM

## 2024-05-31 RX ORDER — CETIRIZINE HYDROCHLORIDE 10 MG/1
10 TABLET ORAL NIGHTLY
Status: DISCONTINUED | OUTPATIENT
Start: 2024-05-31 | End: 2024-05-31 | Stop reason: HOSPADM

## 2024-05-31 RX ORDER — ESCITALOPRAM OXALATE 10 MG/1
10 TABLET ORAL DAILY
Status: DISCONTINUED | OUTPATIENT
Start: 2024-05-31 | End: 2024-05-31 | Stop reason: HOSPADM

## 2024-05-31 RX ADMIN — OXYCODONE 5 MG: 5 TABLET ORAL at 09:36

## 2024-05-31 RX ADMIN — SODIUM CHLORIDE, PRESERVATIVE FREE 10 ML: 5 INJECTION INTRAVENOUS at 07:52

## 2024-05-31 RX ADMIN — METOCLOPRAMIDE HYDROCHLORIDE 10 MG: 5 INJECTION INTRAMUSCULAR; INTRAVENOUS at 08:00

## 2024-05-31 RX ADMIN — SIMETHICONE 80 MG: 80 TABLET, CHEWABLE ORAL at 13:09

## 2024-05-31 RX ADMIN — ONDANSETRON 4 MG: 2 INJECTION INTRAMUSCULAR; INTRAVENOUS at 11:49

## 2024-05-31 RX ADMIN — ENOXAPARIN SODIUM 40 MG: 100 INJECTION SUBCUTANEOUS at 07:52

## 2024-05-31 RX ADMIN — PANTOPRAZOLE SODIUM 40 MG: 40 INJECTION, POWDER, FOR SOLUTION INTRAVENOUS at 07:51

## 2024-05-31 RX ADMIN — Medication 0.5 MG: at 06:22

## 2024-05-31 RX ADMIN — Medication 0.5 MG: at 10:30

## 2024-05-31 RX ADMIN — SUCRALFATE 1 G: 1 TABLET ORAL at 06:11

## 2024-05-31 RX ADMIN — SULFAMETHOXAZOLE AND TRIMETHOPRIM 1 TABLET: 800; 160 TABLET ORAL at 07:52

## 2024-05-31 RX ADMIN — BUPROPION HYDROCHLORIDE 150 MG: 150 TABLET, FILM COATED, EXTENDED RELEASE ORAL at 10:23

## 2024-05-31 RX ADMIN — ESCITALOPRAM OXALATE 10 MG: 10 TABLET ORAL at 10:23

## 2024-05-31 RX ADMIN — SUCRALFATE 1 G: 1 TABLET ORAL at 11:19

## 2024-05-31 RX ADMIN — FAMOTIDINE 20 MG: 10 INJECTION, SOLUTION INTRAVENOUS at 07:51

## 2024-05-31 ASSESSMENT — PAIN DESCRIPTION - LOCATION
LOCATION: ABDOMEN
LOCATION: ABDOMEN

## 2024-05-31 ASSESSMENT — PAIN SCALES - GENERAL
PAINLEVEL_OUTOF10: 6
PAINLEVEL_OUTOF10: 6

## 2024-05-31 ASSESSMENT — PAIN DESCRIPTION - ORIENTATION
ORIENTATION: LEFT
ORIENTATION: MID

## 2024-05-31 ASSESSMENT — PAIN DESCRIPTION - DESCRIPTORS
DESCRIPTORS: ACHING
DESCRIPTORS: ACHING

## 2024-05-31 NOTE — PROGRESS NOTES
Pt calmer now.  Spoke with father, with patient's permission, and he is seeing if step mother can come and  the patient.  OK for discharge with family.

## 2024-05-31 NOTE — CONSULTS
[facility], Department of Psychiatry  Behavioral Health Consult    REASON FOR CONSULT:     CONSULTING PHYSICIAN: Dr Smith    History obtained from: patient    HISTORY OF PRESENT ILLNESS:      The patient is a 29 y.o. female with significant past psychiatric history of panic disorder presenting with intractable nausea and vomiting.  Patient got admitted on 5/29.  This morning patient reported that she had a panic attack which went out of control and she started banging her hand at the bed rails.  Patient reportedly attempted to hold herself if she does not get what she wanted and she was asking for morphine.  During interview patient reported that she got agitated and panicky.  Patient has not been getting her home medication of Wellbutrin and Lexapro..  Patient reports that she is now suicidal.  Patient denies feeling depressed but anxious.  Patient has PTSD from past psychiatric admission and she is getting worried about her stay in the hospital.  Patient is happy that she is going to be leaving later today.  Patient denies any hopeless or worthless feeling she denies any access to any guns or weapons at home.  Patient denies audiovisual hallucinations or paranoid thoughts.  Patient see her primary care doctor who has been treating her for depression and panic disorder.        The patient is currently receiving care for the above psychiatric illness.      Psychiatric Review of Systems       Depression: Denies     Ansley or Hypomania:  no     Panic Attacks:  yes -     Phobias:  no     Obsessions and Compulsions:  no     PTSD : no     Hallucinations:  no     Delusions:  no      Substance Abuse History:  ETOH: no  Marijuana: no  Opiates: no  Other Drugs: no      Past Psychiatric History:  Depression panic disorder, no past psychiatric admission or suicidal attempt      Past Medical History:        Diagnosis Date    Asthma     Kidney stone     Scoliosis        Past Surgical History:        Procedure   COMPARISON: Radiograph dated 05/24/2024    ACCESSION NUMBER(S): [id number]    ORDERING CLINICIAN: JOSHUA DOLL    FINDINGS: The cardiac silhouette size is within normal limits.    Bibasilar faint opacity with suggestion of blunting of the costophrenic angles. No fernandez edema or pneumothorax.    No acute osseous abnormality.       1. Bibasilar atelectasis and suggestion of small/trace bilateral pleural effusion. No edema or pneumothorax. No focal infiltrate          Signed by: John Alegria 5/25/2024 8:04 AM Dictation workstation:   [id number]    XR CHEST 1 VIEW    Result Date: 5/24/2024  Interpreted By:  Dallas Leija, STUDY: XR CHEST 1 VIEW;  5/24/2024 10:53 am    INDICATION: Signs/Symptoms:s/p paraesophageal hernia repair.    COMPARISON: Chest radiograph dated 5/23/2024two-view chest 09/16/2020    ACCESSION NUMBER(S): [id number]    ORDERING CLINICIAN: JOSHUA DOLL    FINDINGS: AP radiograph of the chest    Limitations: Decreased lung volumes. Patient is rotated to the left.    The heart is enlarged. The left ventricle obscures the retrocardiac left lower lobe. Linear stranding is noted within the retrocardiac space peribronchial thickening may be present within the left hilum. The right lung is essentially well aerated.       1. Cardiomegaly since previous study 2. CT examination of the chest would be recommended to better delineate left lower lobe and hilar anatomy.          Signed by: Dallas Leija 5/24/2024 5:01 PM Dictation workstation:   [id number]      EKG: TRACING REVIEWED    RECOMMENDATIONS    Risk Management:  routine:  no special precautions necessary    Medications: As needed Ativan as ordered to continue for any further panic attacks  Continue Lexapro and Wellbutrin  Patient is not at imminent risk of suicide  Can be discharged when medically stable  DC sitter  Discussed with the treating physician/ team about the patient and treatment plan  Reviewed the chart    Discussed with the

## 2024-05-31 NOTE — DISCHARGE SUMMARY
Physician Discharge Summary     Patient ID:  Aliosn Villegas  [id number]  29 y.o.  1995    Admit date: 5/29/2024    Discharge date and time: No discharge date for patient encounter. 5/31/2024    Admitting Physician: Jayjay Smith MD     Discharge Physician: same    Admission Diagnoses: Gastric distention [K31.89]  Other postoperative complication involving digestive system [K91.89]  Nausea and vomiting, unspecified vomiting type [R11.2]  Gastroparesis [K31.84]    Discharge Diagnoses: esophagitis, same    Admission Condition: fair    Discharged Condition: good    Indication for Admission: emesis    Hospital Course: Pt had a lap Toupet fundoplication last week at another hospital.  Discharged POD 1 and did well for about 4 days.  Day prior to admission she started having intractable nausea and emesis, non bloody.  Came to ED where CT showed a dilated stomach and duodenum.  NG decompression successful and kept NPO.  Better next AM but concern for gastroparesis or pyloric spasm.  EGD showed esophagitis, but no gastric issues.  Full liquids started and advanced to soft food next AM.   Nausea resolved and patient discharged to home to follow up with operative surgeon as previously scheduled.    Consults: GI    Significant Diagnostic Studies: EGD with esophagitis only    Treatments: IV hydration    Discharge Exam:  /66   Pulse 73   Temp 98.2 °F (36.8 °C) (Oral)   Resp 16   Ht 1.702 m (5' 7\")   Wt 82.6 kg (182 lb)   LMP 05/22/2024 (Approximate)   SpO2 99%   BMI 28.51 kg/m²     General Appearance:    Alert, cooperative, no distress, appears stated age   Head:    Normocephalic, without obvious abnormality, atraumatic   Eyes:    PERRL, conjunctiva/corneas clear, EOM's intact, fundi     benign, both eyes   Ears:    Normal TM's and external ear canals, both ears   Nose:   Nares normal, septum midline, mucosa normal, no drainage    or sinus tenderness   Throat:   Lips, mucosa, and tongue normal; teeth and gums 
within functional limits

## 2024-05-31 NOTE — DISCHARGE INSTRUCTIONS
Soft diet, or continue dietary restrictions placed by operative surgeon.  Continue all prior restrictions and instructions placed by operative surgeon.  Eat slowly.  Follow up with operative surgeon as previously scheduled.  Call my office if unable to get in with operative surgeon.

## 2024-05-31 NOTE — PROGRESS NOTES
Patient reportedly did well with liquids yesterday and overnight with no complaints.  Discharge was written for.  Per the nursing when they went in to talk to her about being discharged today the patient then suddenly started complaining of nausea and started having agitated behavior.  She reportedly threatened to hurt herself if she does not get what she wants and was asking for morphine.  When the nurses reapproached her about the threat to herself she then said she was just kidding.  Upon my arrival the patient is out of bed sitting on the windowsill.  She is tearful and a little agitated.  She says that she has PTSD from hospital stays.  I tried to calm her down and reassure her that we will make sure we restart her home meds for anxiety as well as give her a little bit of extra Ativan which did seem to help.  I have asked her to call her father who also helps calm her down.  I informed her that if she has PTSD with hospital stays then the best thing would be to get her home to where she can get back to her normal environment.  I reassured her that there is no surgical reason for her to be in the hospital since the upper endoscopy did not show any problems with her stomach and everything looked good.  She seemed to calm down with this news.    Because of the comment about wanting to hurt herself if she does not get what she wants I have reached out to risk-management and asked the nurses to document as well exactly what they heard.   DISPLAY PLAN FREE TEXT

## 2024-05-31 NOTE — PROGRESS NOTES
0700 Report received from MICKI Thomas. Per night shift, patient had an uneventful night, complaining of no pain/nausea/vomiting. Patient resting comfortably upon entering room. When informed of her possible discharge if she tolerates breakfast, patient began to dry heave and complain of nausea. Patient medicated with reglan per MAR along with morning medications.     0900 Patient continues to complain of nausea/pain and requesting another dose of ativan for anxiety. Notified patient that ativan was not available yet as ordered. Patient continues to gradually increase. Patient requesting morphine for pain and begins pacing the room and yelling loudly in the room which can be heard down the hallway. This RN and Segun RN entered the room to speak to patient further. Patient observed pacing room and hyperventilating stating \"I just need something to calm me down\". Patient again requests morphine. When questioned on if her complaints were pain or anxiety related, patient states \"I don't know\". Patient now sitting in recliner rocking back and forth. Patient continues to repeat several times \"I need help\". [employer] sent to Dr. Smith regarding morphine administration. Orders received.     0935 Patient increasingly hysterical, continues to yell down hallway. This RN and Segun RN entered room with patient dose of oxycodone as ordered. Patient pacing room at this time, states \"I am just too anxious, I need something to calm down\". Educated patient again that ativan order is not yet available. Patient then states \"If I don't get something to help me calm down I am going to hurt myself.\" When questioned further as to what patient meant by this statement, she then states \"No never mind, I just want to talk to the doctor\". Zaki SWEET aware of patient statement and Dr. Smith aware    0950 Dr Smith at bedside, home medications reordered. Patient educated by Dr Smith that she is medically cleared and has no medical reason to stay

## 2024-05-31 NOTE — PROGRESS NOTES
EGD without obstruction or pyloric spasm.  Tolerated full liquids overnight without emesis.  Advance to soft food and discharge if able to eat breakfast

## 2024-05-31 NOTE — PLAN OF CARE
Problem: Discharge Planning  Goal: Discharge to home or other facility with appropriate resources  5/30/2024 2155 by Martha Oconnor RN  Outcome: Progressing  5/30/2024 1336 by Segun Doll RN  Outcome: Progressing  5/30/2024 1335 by Segun Doll RN  Outcome: Progressing  5/30/2024 1051 by Segun Doll RN  Outcome: Progressing  Flowsheets (Taken 5/30/2024 0750)  Discharge to home or other facility with appropriate resources: Identify barriers to discharge with patient and caregiver     Problem: Pain  Goal: Verbalizes/displays adequate comfort level or baseline comfort level  5/30/2024 2155 by Martha Oconnor RN  Outcome: Progressing  5/30/2024 1336 by Segun Doll RN  Outcome: Progressing  5/30/2024 1335 by Segun Doll RN  Outcome: Progressing  5/30/2024 1051 by Segun Doll RN  Outcome: Progressing     Problem: Safety - Adult  Goal: Free from fall injury  5/30/2024 2155 by Martha Oconnor RN  Outcome: Progressing  5/30/2024 1336 by Segun Doll RN  Outcome: Progressing

## 2024-05-31 NOTE — PLAN OF CARE
Problem: Discharge Planning  Goal: Discharge to home or other facility with appropriate resources  5/31/2024 1010 by Odessa Hensley RN  Outcome: Progressing  5/30/2024 2155 by Martha Oconnor RN  Outcome: Progressing     Problem: Pain  Goal: Verbalizes/displays adequate comfort level or baseline comfort level  5/31/2024 1010 by Odessa Hensley RN  Outcome: Progressing  5/30/2024 2155 by Martha Oconnor RN  Outcome: Progressing     Problem: Safety - Adult  Goal: Free from fall injury  5/31/2024 1010 by Odessa Hensley RN  Outcome: Progressing  5/30/2024 2155 by Martha Oconnor RN  Outcome: Progressing

## 2024-05-31 NOTE — PROGRESS NOTES
Shift assessment completed and documented, see flowsheets. VSS. A&Ox4. Pt appears very anxious and is yelling out, sitting on side of bed rocking back and forth. Reglan, PRN morphine, and ativan given. All other meds administered per MAR. Pain 8/10. Incisions on abdomen closed with surgical glue, ORTIZ. New dressing applied to IV site. IV fluids running at 75 mL/hr.     2200: Pt is now resting comfortably in bed with eyes closed. Denies any needs at this time. Safety maintained. Call light within reach.    Electronically signed by KARINA MERINO RN on 5/30/24 at 9:59 PM EDT

## 2024-06-03 ENCOUNTER — TELEPHONE (OUTPATIENT)
Dept: FAMILY MEDICINE CLINIC | Age: 29
End: 2024-06-03

## 2024-06-03 NOTE — TELEPHONE ENCOUNTER
Care Transitions Initial Follow Up Call    Outreach made within 2 business days of discharge: Yes    Patient: Alison Villegas Patient : 1995   MRN: [id number]  Reason for Admission: There are no discharge diagnoses documented for the most recent discharge.  Discharge Date: 24       Spoke with: KERA X1    Discharge department/facility: [facility]    TCM Interactive Patient Contact:    Scheduled appointment with PCP within 7-14 days    Follow Up  Future Appointments   Date Time Provider Department Center   2024  8:30 AM Kevin Blum MD [facility] Kathryn Dean MA

## 2024-06-03 NOTE — TELEPHONE ENCOUNTER
Care Transitions Initial Follow Up Call    Outreach made within 2 business days of discharge: Yes    Patient: Alison Villegas Patient : 1995   MRN: [id number]  Reason for Admission: There are no discharge diagnoses documented for the most recent discharge.  Discharge Date: 24       Spoke with: KERA X2    Discharge department/facility: [facility]    TCM Interactive Patient Contact:    Scheduled appointment with PCP within 7-14 days    Follow Up  Future Appointments   Date Time Provider Department Center   2024  8:30 AM Kevin Blum MD [facility] Kathryn Dean MA

## 2024-06-05 NOTE — PROGRESS NOTES
Physician Progress Note      PATIENT:               YARIEL REILLY  [facility] #:                  [id number]  :                       1995  ADMIT DATE:       2024 11:19 PM  DISCH DATE:        2024 2:27 PM  RESPONDING  PROVIDER #:        Jayjay Marinelli MD          QUERY TEXT:    Pt admitted with nausea and vomiting, s/p laparoscopic hiatal hernia repair   with fundoplication 6 days prior. CT scan showed gastric and duodenal   distention. EGD showed no pyloric stenosis or gastric outlet obstruction. D/c   summary notes postoperative complication of digestive system, gastroparesis   and esophagitis. If possible, please document the etiology of nausea and   vomiting:    The medical record reflects the following:  Risk Factors: hiatal hernia repair, GERD  Clinical Indicators: EGD: Impression: Multiple punctate areas in the mid   gastric body, a few gastric erosions in the mid body suggestive of trauma from   NG tube noted. Pylorus is open and patent without any evidence for pyloric   spasm or stenosis. Normal esophagus. Z-line irregular, 40 cm from the   incisors. Normal examined duodenum.  CT AP: Appearance suggesting gastric fundoplication. Moderate gastric air   distension. No evidence for obstructing gastric outlet process or mass.   Underlying gastroparesis is possible.  D/c summary \"Day prior to admission she started having intractable nausea and   emesis, non bloody.  Came to ED where CT showed a dilated stomach and   duodenum.  NG decompression successful and kept NPO.  Better next AM but   concern for gastroparesis or pyloric spasm.  EGD showed esophagitis, but no   gastric issues.\"  Treatment: EGD, Carafate, PPI, GI consult, CT abd/pelvis    Thank you, Manisha Boss RN BSN [facility]  [phone]  Options provided:  -- Nausea and vomiting is due to the hiatal hernia repair procedure  -- Nausea and vomiting is not due to the procedure, but is likely due to   gastroparesis  -- Nausea and vomiting is

## 2024-06-06 ENCOUNTER — APPOINTMENT (OUTPATIENT)
Dept: GENERAL RADIOLOGY | Age: 29
End: 2024-06-06
Payer: COMMERCIAL

## 2024-06-06 ENCOUNTER — HOSPITAL ENCOUNTER (EMERGENCY)
Age: 29
Discharge: ANOTHER HOSPITAL | End: 2024-06-06
Payer: COMMERCIAL

## 2024-06-06 ENCOUNTER — APPOINTMENT (OUTPATIENT)
Dept: CT IMAGING | Age: 29
End: 2024-06-06
Payer: COMMERCIAL

## 2024-06-06 VITALS
WEIGHT: 182 LBS | DIASTOLIC BLOOD PRESSURE: 85 MMHG | HEIGHT: 67 IN | TEMPERATURE: 98.1 F | OXYGEN SATURATION: 99 % | HEART RATE: 76 BPM | BODY MASS INDEX: 28.56 KG/M2 | SYSTOLIC BLOOD PRESSURE: 114 MMHG | RESPIRATION RATE: 15 BRPM
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
## 2024-06-06 DIAGNOSIS — K56.699 FOOD BOLUS OBSTRUCTION OF INTESTINE (HCC): Primary | ICD-10-CM

## 2024-06-06 LAB
ALBUMIN SERPL-MCNC: 3.7 G/DL (ref 3.5–4.6)
ALP SERPL-CCNC: 58 U/L (ref 40–130)
ALT SERPL-CCNC: 12 U/L (ref 0–33)
ANION GAP SERPL CALCULATED.3IONS-SCNC: 9 MEQ/L (ref 9–15)
AST SERPL-CCNC: 12 U/L (ref 0–35)
BASOPHILS # BLD: 0 K/UL (ref 0–0.2)
BASOPHILS # BLD: 0.1 K/UL (ref 0–0.2)
BASOPHILS NFR BLD: 0.4 %
BASOPHILS NFR BLD: 0.6 %
BILIRUB SERPL-MCNC: 0.5 MG/DL (ref 0.2–0.7)
BUN SERPL-MCNC: 7 MG/DL (ref 6–20)
CALCIUM SERPL-MCNC: 8.5 MG/DL (ref 8.5–9.9)
CHLORIDE SERPL-SCNC: 106 MEQ/L (ref 95–107)
CO2 SERPL-SCNC: 22 MEQ/L (ref 20–31)
CREAT SERPL-MCNC: 0.63 MG/DL (ref 0.5–0.9)
EOSINOPHIL # BLD: 0.1 K/UL (ref 0–0.7)
EOSINOPHIL # BLD: 0.4 K/UL (ref 0–0.7)
EOSINOPHIL NFR BLD: 0.6 %
EOSINOPHIL NFR BLD: 3 %
ERYTHROCYTE [DISTWIDTH] IN BLOOD BY AUTOMATED COUNT: 13.8 % (ref 11.5–14.5)
ERYTHROCYTE [DISTWIDTH] IN BLOOD BY AUTOMATED COUNT: 13.9 % (ref 11.5–14.5)
GLOBULIN SER CALC-MCNC: 2.3 G/DL (ref 2.3–3.5)
GLUCOSE SERPL-MCNC: 102 MG/DL (ref 70–99)
HCG, URINE, POC: NEGATIVE
HCT VFR BLD AUTO: 34.5 % (ref 37–47)
HCT VFR BLD AUTO: 45 % (ref 37–47)
HGB BLD-MCNC: 12 G/DL (ref 12–16)
HGB BLD-MCNC: 15.2 G/DL (ref 12–16)
LACTATE BLDV-SCNC: 2.3 MMOL/L (ref 0.5–2.2)
LIPASE SERPL-CCNC: 16 U/L (ref 12–95)
LYMPHOCYTES # BLD: 0.9 K/UL (ref 1–4.8)
LYMPHOCYTES # BLD: 1.7 K/UL (ref 1–4.8)
LYMPHOCYTES NFR BLD: 12 %
LYMPHOCYTES NFR BLD: 6.7 %
Lab: NORMAL
MCH RBC QN AUTO: 29.1 PG (ref 27–31.3)
MCH RBC QN AUTO: 29.5 PG (ref 27–31.3)
MCHC RBC AUTO-ENTMCNC: 33.8 % (ref 33–37)
MCHC RBC AUTO-ENTMCNC: 34.8 % (ref 33–37)
MCV RBC AUTO: 84.8 FL (ref 79.4–94.8)
MCV RBC AUTO: 86 FL (ref 79.4–94.8)
MONOCYTES # BLD: 0.8 K/UL (ref 0.2–0.8)
MONOCYTES # BLD: 0.8 K/UL (ref 0.2–0.8)
MONOCYTES NFR BLD: 5.5 %
MONOCYTES NFR BLD: 6.7 %
NEGATIVE QC PASS/FAIL: NORMAL
NEUTROPHILS # BLD: 11.9 K/UL (ref 1.4–6.5)
NEUTROPHILS # BLD: 9 K/UL (ref 1.4–6.5)
NEUTS SEG NFR BLD: 76 %
NEUTS SEG NFR BLD: 86.2 %
PERFORMED ON: NORMAL
PLATELET # BLD AUTO: 390 K/UL (ref 130–400)
PLATELET # BLD AUTO: ABNORMAL K/UL (ref 130–400)
PLATELET BLD QL SMEAR: ABNORMAL
POC CREATININE WHOLE BLOOD: 0.8
POC CREATININE: 0.8 MG/DL (ref 0.6–1.2)
POC SAMPLE TYPE: NORMAL
POSITIVE QC PASS/FAIL: NORMAL
POTASSIUM SERPL-SCNC: 3.3 MEQ/L (ref 3.4–4.9)
PROT SERPL-MCNC: 6 G/DL (ref 6.3–8)
RBC # BLD AUTO: 4.07 M/UL (ref 4.2–5.4)
RBC # BLD AUTO: 5.23 M/UL (ref 4.2–5.4)
REASON FOR REJECTION: NORMAL
REASON FOR REJECTION: NORMAL
REJECTED TEST: NORMAL
REJECTED TEST: NORMAL
SLIDE REVIEW: ABNORMAL
SMUDGE CELLS BLD QL SMEAR: 9.5
SODIUM SERPL-SCNC: 137 MEQ/L (ref 135–144)
VARIANT LYMPHS NFR BLD: 2 %
WBC # BLD AUTO: 11.9 K/UL (ref 4.8–10.8)
WBC # BLD AUTO: 13.8 K/UL (ref 4.8–10.8)

## 2024-06-06 PROCEDURE — 96375 TX/PRO/DX INJ NEW DRUG ADDON: CPT

## 2024-06-06 PROCEDURE — 71045 X-RAY EXAM CHEST 1 VIEW: CPT

## 2024-06-06 PROCEDURE — 80053 COMPREHEN METABOLIC PANEL: CPT

## 2024-06-06 PROCEDURE — 2580000003 HC RX 258

## 2024-06-06 PROCEDURE — 99285 EMERGENCY DEPT VISIT HI MDM: CPT

## 2024-06-06 PROCEDURE — 6360000002 HC RX W HCPCS: Performed by: PHYSICIAN ASSISTANT

## 2024-06-06 PROCEDURE — 83605 ASSAY OF LACTIC ACID: CPT

## 2024-06-06 PROCEDURE — 70360 X-RAY EXAM OF NECK: CPT

## 2024-06-06 PROCEDURE — 96365 THER/PROPH/DIAG IV INF INIT: CPT

## 2024-06-06 PROCEDURE — 6360000002 HC RX W HCPCS

## 2024-06-06 PROCEDURE — 85025 COMPLETE CBC W/AUTO DIFF WBC: CPT

## 2024-06-06 PROCEDURE — 2500000003 HC RX 250 WO HCPCS

## 2024-06-06 PROCEDURE — A4216 STERILE WATER/SALINE, 10 ML: HCPCS

## 2024-06-06 PROCEDURE — 83690 ASSAY OF LIPASE: CPT

## 2024-06-06 PROCEDURE — 74177 CT ABD & PELVIS W/CONTRAST: CPT

## 2024-06-06 PROCEDURE — 36415 COLL VENOUS BLD VENIPUNCTURE: CPT

## 2024-06-06 PROCEDURE — 96376 TX/PRO/DX INJ SAME DRUG ADON: CPT

## 2024-06-06 PROCEDURE — 6360000004 HC RX CONTRAST MEDICATION

## 2024-06-06 RX ORDER — LORAZEPAM 2 MG/ML
1 INJECTION INTRAMUSCULAR ONCE
Status: COMPLETED | OUTPATIENT
Start: 2024-06-06 | End: 2024-06-06

## 2024-06-06 RX ORDER — ONDANSETRON 2 MG/ML
4 INJECTION INTRAMUSCULAR; INTRAVENOUS ONCE
Status: COMPLETED | OUTPATIENT
Start: 2024-06-06 | End: 2024-06-06

## 2024-06-06 RX ORDER — GLUCAGON 1 MG/ML
2 KIT INJECTION ONCE
Status: COMPLETED | OUTPATIENT
Start: 2024-06-06 | End: 2024-06-06

## 2024-06-06 RX ORDER — 0.9 % SODIUM CHLORIDE 0.9 %
1000 INTRAVENOUS SOLUTION INTRAVENOUS ONCE
Status: COMPLETED | OUTPATIENT
Start: 2024-06-06 | End: 2024-06-06

## 2024-06-06 RX ADMIN — ONDANSETRON 4 MG: 2 INJECTION INTRAMUSCULAR; INTRAVENOUS at 10:04

## 2024-06-06 RX ADMIN — FAMOTIDINE 20 MG: 10 INJECTION, SOLUTION INTRAVENOUS at 03:03

## 2024-06-06 RX ADMIN — IOPAMIDOL 75 ML: 755 INJECTION, SOLUTION INTRAVENOUS at 03:28

## 2024-06-06 RX ADMIN — ONDANSETRON 4 MG: 2 INJECTION INTRAMUSCULAR; INTRAVENOUS at 03:42

## 2024-06-06 RX ADMIN — SODIUM CHLORIDE 1000 ML: 9 INJECTION, SOLUTION INTRAVENOUS at 03:02

## 2024-06-06 RX ADMIN — SODIUM CHLORIDE 1000 ML: 9 INJECTION, SOLUTION INTRAVENOUS at 05:47

## 2024-06-06 RX ADMIN — PROMETHAZINE HYDROCHLORIDE 12.5 MG: 25 INJECTION INTRAMUSCULAR; INTRAVENOUS at 05:03

## 2024-06-06 RX ADMIN — Medication 1 MG: at 05:00

## 2024-06-06 RX ADMIN — GLUCAGON 2 MG: 1 INJECTION, POWDER, LYOPHILIZED, FOR SOLUTION INTRAMUSCULAR; INTRAVENOUS at 03:03

## 2024-06-06 ASSESSMENT — PAIN SCALES - GENERAL: PAINLEVEL_OUTOF10: 6

## 2024-06-06 ASSESSMENT — PAIN - FUNCTIONAL ASSESSMENT: PAIN_FUNCTIONAL_ASSESSMENT: 0-10

## 2024-06-06 ASSESSMENT — ENCOUNTER SYMPTOMS
VOMITING: 1
NAUSEA: 1

## 2024-06-06 NOTE — ED NOTES
Pt safely ambulated to the bathroom with no assist.   Pt requesting more nausea medication. (GIO Arias notified)

## 2024-06-06 NOTE — ED NOTES
Pt here after eating biscuit at 12pm. Patient states since then she has been unable to swallow since. Patient states food feels like it is travelling down her throat, she now feels it is mid-chest. Patient states she had hiatal hernia surgery in May, and reports recent admissions related to nausea and vomiting. Patient VSS.

## 2024-06-06 NOTE — ED NOTES
Pt pacing back in fourth in room and taking monitoring devices off and states \"I'm sorry, I'm just getting really anxious now. They also were giving Ativan.\"    NP Pam made aware

## 2024-06-06 NOTE — ED NOTES
Pt requesting something more for nausea at this time.  Pt states she was taking zofran, reglan, and compazine at home.    TIFFANIE Orozco made aware

## 2024-06-06 NOTE — ED NOTES
Pt to Xray and CT by cart. CT and Xray notified that patient has elected to sign pregnancy waiver.

## 2024-06-06 NOTE — ED NOTES
Called [facility] for a consult to Dr. Saldivar at [facility]

## 2024-06-06 NOTE — ED NOTES
Pt ambulating to the bathroom without assistance at this time.  Gait is steady.  Resps even non labored.  Skin p/w/d.  No acute distress noted

## (undated) DEVICE — CONMED SCOPE SAVER BITE BLOCK, 20X27 MM: Brand: SCOPE SAVER

## (undated) DEVICE — SINGLE PORT MANIFOLD: Brand: NEPTUNE 2

## (undated) DEVICE — TUBE SET 96 MM 64 MM H2O PERISTALTIC STD AUX CHANNEL

## (undated) DEVICE — Device: Brand: ENDO SMARTCAP

## (undated) DEVICE — BRUSH ENDO CLN L90.5IN SHTH DIA1.7MM BRIST DIA5-7MM 2-6MM

## (undated) DEVICE — TUBING, SUCTION, 1/4" X 10', STRAIGHT: Brand: MEDLINE

## (undated) DEVICE — ENDO CARRY-ON PROCEDURE KIT: Brand: ENDO CARRY-ON PROCEDURE KIT